# Patient Record
Sex: MALE | Race: WHITE
[De-identification: names, ages, dates, MRNs, and addresses within clinical notes are randomized per-mention and may not be internally consistent; named-entity substitution may affect disease eponyms.]

---

## 2018-08-11 ENCOUNTER — HOSPITAL ENCOUNTER (EMERGENCY)
Dept: HOSPITAL 74 - FER | Age: 83
Discharge: HOME | End: 2018-08-11
Payer: COMMERCIAL

## 2018-08-11 DIAGNOSIS — Z87.891: ICD-10-CM

## 2018-08-11 DIAGNOSIS — E03.9: ICD-10-CM

## 2018-08-11 DIAGNOSIS — D69.6: Primary | ICD-10-CM

## 2018-08-11 DIAGNOSIS — I48.91: ICD-10-CM

## 2018-08-11 DIAGNOSIS — Z95.5: ICD-10-CM

## 2018-08-11 DIAGNOSIS — E78.00: ICD-10-CM

## 2018-08-11 DIAGNOSIS — Z79.01: ICD-10-CM

## 2018-08-11 LAB
ALBUMIN SERPL-MCNC: 3.5 G/DL (ref 3.5–5)
ALP SERPL-CCNC: 77 U/L (ref 32–92)
ALT SERPL-CCNC: 36 U/L (ref 10–40)
ANION GAP SERPL CALC-SCNC: 9 MMOL/L (ref 8–16)
AST SERPL-CCNC: 33 U/L (ref 10–42)
BASOPHILS # BLD: 0.5 % (ref 0–2)
BILIRUB SERPL-MCNC: 1.1 MG/DL (ref 0.2–1)
BUN SERPL-MCNC: 35 MG/DL (ref 7–18)
CALCIUM SERPL-MCNC: 8 MG/DL (ref 8.4–10.2)
CHLORIDE SERPL-SCNC: 103 MMOL/L (ref 98–107)
CO2 SERPL-SCNC: 26 MMOL/L (ref 22–28)
CREAT SERPL-MCNC: 1.3 MG/DL (ref 0.6–1.3)
DEPRECATED RDW RBC AUTO: 13.7 % (ref 11.9–15.9)
EOSINOPHIL # BLD: 1.1 % (ref 0–4.5)
GLUCOSE SERPL-MCNC: 121 MG/DL (ref 74–106)
HCT VFR BLD CALC: 35.9 % (ref 35.4–49)
HGB BLD-MCNC: 12 GM/DL (ref 11.7–16.9)
INR BLD: 2.99 (ref 0.82–1.09)
LYMPHOCYTES # BLD: 21.4 % (ref 8–40)
MCH RBC QN AUTO: 31.5 PG (ref 25.7–33.7)
MCHC RBC AUTO-ENTMCNC: 33.5 G/DL (ref 32–35.9)
MCV RBC: 94.2 FL (ref 80–96)
MONOCYTES # BLD AUTO: 8.1 % (ref 3.8–10.2)
NEUTROPHILS # BLD: 68.9 % (ref 42.8–82.8)
PLATELET # BLD AUTO: 105 K/MM3 (ref 134–434)
PMV BLD: 9.7 FL (ref 7.5–11.1)
POTASSIUM SERPLBLD-SCNC: 3.3 MMOL/L (ref 3.5–5.1)
PROT SERPL-MCNC: 6.3 G/DL (ref 6.4–8.3)
PT PNL PPP: 32.8 SEC (ref 10.2–13)
RBC # BLD AUTO: 3.81 M/MM3 (ref 4–5.6)
SODIUM SERPL-SCNC: 138 MMOL/L (ref 136–145)
WBC # BLD AUTO: 9.4 K/MM3 (ref 4–10.8)

## 2018-08-11 NOTE — PDOC
History of Present Illness





- General


History Source: Patient, Family


Exam Limitations: No Limitations





- History of Present Illness


Initial Comments: 





08/11/18 19:55


Patient is an 85 year old male with a significant past medical history of Heart 

attack (on coumadin), Stroke, Gout, Arthritis, who presents to the ED with 

complaints of left shoulder and arm pain that began earlier this week.  As per 

patient's daughter, patient has been complaining to her about his left shoulder 

and arm pain that he states increased in intensity last night.  She reports 

coming to his home this morning when she noticed a large bruise on his left 

arm.  Patients daughter reports calling his primary care doctor who advised 

she take him into the ED for further evaluation and to have a PT and InR test 

conducted.  





Denies chest pain, Sob. Denies nausea, vomiting. Denies fevers, chills. Denies 

contact with sick individuals, out of state travelling. Denies dysuria, 

hematuria. Denies diarrhea, constipation. Denies trauma to affected area. 

Denies any other symptoms. 





Allergies: Sulfa, Sulfamethoxazole, trimethoprim


Social history: Lives with wife. No smoking. No alcohol. No illicit drugs. 


Surgical history: x3 cardiac stents. 


PMD: Dr. David Knox








<Karl Perez - Last Filed: 08/11/18 19:55>





<Kiesha Jacobo - Last Filed: 08/11/18 21:33>





- General


Chief Complaint: Injury


Stated Complaint: SHOULDER HEMATOMA


Time Seen by Provider: 08/11/18 19:37





Past History





<Karl Perez - Last Filed: 08/11/18 19:55>





- Past Medical History


Cardiac Disorders: Yes (A-FIB)


CVA: Yes


COPD: No


Hypercholesterolemia: Yes


Thyroid Disease: Yes


Other medical history: GOUT





- Surgical History


Cardiac Surgery: Yes (STENT X3)





- Suicide/Smoking/Psychosocial Hx


Smoking History: Former smoker


Have you smoked in the past 12 months: No


If you are a former smoker, when did you quit?: 1971


Information on smoking cessation initiated: No


Substance Use Type: None





<Kiesha Jacobo - Last Filed: 08/11/18 21:33>





- Past Medical History


Allergies/Adverse Reactions: 


 Allergies











Allergy/AdvReac Type Severity Reaction Status Date / Time


 


Sulfa (Sulfonamide Allergy Mild Rash Verified 08/11/18 18:28





Antibiotics)     


 


sulfamethoxazole Allergy Mild Rash Verified 08/11/18 18:28





[From Bactrim]     


 


trimethoprim [From Bactrim] Allergy Mild Rash Verified 08/11/18 18:28











Home Medications: 


Ambulatory Orders





Allopurinol [Zyloprim -] 200 mg PO DAILY 08/11/18 


Aspirin Coated [Ecotrin -] 81 mg PO DAILY 08/11/18 


Atorvastatin Calcium 40 mg PO HS 08/11/18 


Clopidogrel Bisulfate [Clopidogrel] 75 mg PO DAILY 08/11/18 


Colchicine 0.6 mg PO DAILY 08/11/18 


Furosemide [Lasix] 40 mg PO BID 08/11/18 


Levothyroxine [Synthroid -] 75 mcg PO DAILY 08/11/18 


Lisinopril 10 mg PO DAILY 08/11/18 


Metoprolol Tartrate [Lopressor] 50 mg PO BID 08/11/18 


Potassium Chloride 10 meq PO BID 08/11/18 


Warfarin Na [Coumadin] 2.5 mg PO MOTUWETHFRSA 08/11/18 


Warfarin Sodium [Coumadin] 5 mg PO HASSAN 08/11/18 











**Review of Systems





- Review of Systems


Able to Perform ROS?: Yes


Comments:: 





08/11/18 19:55


GENERAL/CONSTITUTIONAL: No fever or chills. No weakness.


HEAD, EYES, EARS, NOSE AND THROAT: No change in vision. No ear pain or 

discharge. No sore throat.


CARDIOVASCULAR: No chest pain or shortness of breath.


RESPIRATORY: No cough, wheezing, or hemoptysis.


GASTROINTESTINAL: No nausea, vomiting, diarrhea or constipation.


GENITOURINARY: No dysuria, frequency, or change in urination.


MUSCULOSKELETAL: +Left arm pain. +Left shoulder pain 


No joint or muscle swelling No neck or back pain.


SKIN: No rash


NEUROLOGIC: No headache, vertigo, loss of consciousness, or change in strength/

sensation.


ENDOCRINE: No increased thirst. No abnormal weight change.


HEMATOLOGIC/LYMPHATIC: No anemia, easy bleeding, or history of blood clots.


ALLERGIC/IMMUNOLOGIC: No hives or skin allergy.








<Karl Perez - Last Filed: 08/11/18 19:55>





*Physical Exam





- Physical Exam


Comments: 





08/11/18 19:55


GENERAL: Awake, alert, and fully oriented, in no acute distress


HEAD: No signs of trauma


EYES: PERRLA, EOMI, sclera anicteric, conjunctiva clear


ENT: Auricles normal inspection, hearing grossly normal, nares patent, 

oropharynx clear without exudates. Moist mucosa


NECK: Normal ROM, supple, no lymphadenopathy, JVD, or masses


LUNGS: Breath sounds equal, clear to auscultation bilaterally.  No wheezes, and 

no crackles


HEART: Regular rate and rhythm, normal S1 and S2, no murmurs, rubs or gallops


ABDOMEN: Soft, nontender, normoactive bowel sounds.  No guarding, no rebound.  

No masses


EXTREMITIES: +Entire Right arm bicep black and blue with large bruise 7 inches 

by 7 inches wrapping around his arm. +Tense but not painful. +Full ROM of his 

entire arm. +Good pulses. +Good color. No evidence of compartment syndrome. 


Normal range of motion, no edema.  No clubbing or cyanosis. No cords, erythema, 

or tenderness


NEUROLOGICAL: Cranial nerves II through XII grossly intact.  Normal speech, 

normal gait


SKIN: Warm, Dry, normal turgor, no rashes or lesions noted.








<Karl Perez - Last Filed: 08/11/18 19:55>





ED Treatment Course





- LABORATORY


CBC & Chemistry Diagram: 


 08/11/18 20:00





 08/11/18 20:00





<Kiesha Jacobo - Last Filed: 08/11/18 21:33>





Medical Decision Making





- Medical Decision Making





08/11/18 21:31


Pt comes with bruising all over and a large hematoma at the left bicep area.


No compartment syndrome. Pt has FROM of shoulders, elbow and wrist and fingers, 

good strength and no pain with ROM.


Pt is on asa, plavix and warfarin. INR is normal, but he has a low platelwet 

count 105K.


He will be referred to hematology.





<Kiesha Jacobo - Last Filed: 08/11/18 21:33>





*DC/Admit/Observation/Transfer





- Attestations


Scribe Attestion: 





08/11/18 19:55





Documentation prepared by Karl Perez, acting as medical scribe for Kiesha Jacobo MD. 





<Karl Perez - Last Filed: 08/11/18 19:55>





- Discharge Dispostion


Decision to Admit order: No





<Kiesha Jacobo - Last Filed: 08/11/18 21:33>


Diagnosis at time of Disposition: 


 Thrombocytopenia








- Discharge Dispostion


Disposition: HOME


Condition at time of disposition: Stable





- Referrals


Referrals: 


David Knox [Primary Care Provider] - 


Ciro Linder MD [Staff Physician] - 


Gumaro Nagy MD [Staff Physician] - 





- Patient Instructions


Printed Discharge Instructions:  Platelet Count





- Post Discharge Activity

## 2018-12-12 ENCOUNTER — RX RENEWAL (OUTPATIENT)
Age: 83
End: 2018-12-12

## 2018-12-12 PROBLEM — Z00.00 ENCOUNTER FOR PREVENTIVE HEALTH EXAMINATION: Status: ACTIVE | Noted: 2018-12-12

## 2018-12-13 ENCOUNTER — RX RENEWAL (OUTPATIENT)
Age: 83
End: 2018-12-13

## 2019-01-02 ENCOUNTER — RX RENEWAL (OUTPATIENT)
Age: 84
End: 2019-01-02

## 2019-01-08 ENCOUNTER — RECORD ABSTRACTING (OUTPATIENT)
Age: 84
End: 2019-01-08

## 2019-01-08 DIAGNOSIS — D69.3 IMMUNE THROMBOCYTOPENIC PURPURA: ICD-10-CM

## 2019-01-08 DIAGNOSIS — I25.2 OLD MYOCARDIAL INFARCTION: ICD-10-CM

## 2019-01-08 DIAGNOSIS — E03.9 HYPOTHYROIDISM, UNSPECIFIED: ICD-10-CM

## 2019-01-08 DIAGNOSIS — N40.0 BENIGN PROSTATIC HYPERPLASIA WITHOUT LOWER URINARY TRACT SYMPMS: ICD-10-CM

## 2019-01-08 DIAGNOSIS — M17.10 UNILATERAL PRIMARY OSTEOARTHRITIS, UNSPECIFIED KNEE: ICD-10-CM

## 2019-01-08 DIAGNOSIS — R91.1 SOLITARY PULMONARY NODULE: ICD-10-CM

## 2019-01-08 DIAGNOSIS — Z84.89 FAMILY HISTORY OF OTHER SPECIFIED CONDITIONS: ICD-10-CM

## 2019-01-08 DIAGNOSIS — Z87.891 PERSONAL HISTORY OF NICOTINE DEPENDENCE: ICD-10-CM

## 2019-01-08 DIAGNOSIS — Z78.9 OTHER SPECIFIED HEALTH STATUS: ICD-10-CM

## 2019-01-08 DIAGNOSIS — M48.061 SPINAL STENOSIS, LUMBAR REGION WITHOUT NEUROGENIC CLAUDICATION: ICD-10-CM

## 2019-01-08 DIAGNOSIS — Z80.9 FAMILY HISTORY OF MALIGNANT NEOPLASM, UNSPECIFIED: ICD-10-CM

## 2019-01-08 RX ORDER — LISINOPRIL 10 MG/1
10 TABLET ORAL
Refills: 0 | Status: ACTIVE | COMMUNITY

## 2019-01-08 RX ORDER — ATORVASTATIN CALCIUM 40 MG/1
40 TABLET, FILM COATED ORAL
Refills: 0 | Status: ACTIVE | COMMUNITY

## 2019-01-08 RX ORDER — LEVOTHYROXINE SODIUM 75 UG/1
75 TABLET ORAL
Refills: 0 | Status: ACTIVE | COMMUNITY

## 2019-01-08 RX ORDER — ASPIRIN 325 MG/1
TABLET, FILM COATED ORAL
Refills: 0 | Status: ACTIVE | COMMUNITY

## 2019-01-08 RX ORDER — CLOPIDOGREL 75 MG/1
75 TABLET, FILM COATED ORAL
Refills: 0 | Status: ACTIVE | COMMUNITY

## 2019-01-17 ENCOUNTER — RX RENEWAL (OUTPATIENT)
Age: 84
End: 2019-01-17

## 2019-02-19 ENCOUNTER — RX RENEWAL (OUTPATIENT)
Age: 84
End: 2019-02-19

## 2019-03-27 ENCOUNTER — APPOINTMENT (OUTPATIENT)
Dept: RHEUMATOLOGY | Facility: CLINIC | Age: 84
End: 2019-03-27
Payer: MEDICARE

## 2019-03-27 VITALS
SYSTOLIC BLOOD PRESSURE: 120 MMHG | HEIGHT: 64 IN | WEIGHT: 196 LBS | BODY MASS INDEX: 33.46 KG/M2 | DIASTOLIC BLOOD PRESSURE: 60 MMHG

## 2019-03-27 DIAGNOSIS — M19.91 PRIMARY OSTEOARTHRITIS, UNSPECIFIED SITE: ICD-10-CM

## 2019-03-27 PROCEDURE — 99214 OFFICE O/P EST MOD 30 MIN: CPT

## 2019-03-27 RX ORDER — FUROSEMIDE 40 MG/1
40 TABLET ORAL
Qty: 180 | Refills: 0 | Status: ACTIVE | COMMUNITY
Start: 2019-01-02

## 2019-03-27 RX ORDER — METOPROLOL TARTRATE 50 MG/1
50 TABLET ORAL TWICE DAILY
Refills: 0 | Status: ACTIVE | COMMUNITY

## 2019-03-27 RX ORDER — POTASSIUM CHLORIDE 1.5 G/1
POWDER, FOR SOLUTION ORAL
Refills: 0 | Status: ACTIVE | COMMUNITY

## 2019-03-27 NOTE — PHYSICAL EXAM
[General Appearance - Alert] : alert [General Appearance - In No Acute Distress] : in no acute distress [General Appearance - Well Nourished] : well nourished [General Appearance - Well Developed] : well developed [Sclera] : the sclera and conjunctiva were normal [] : no respiratory distress [Motor Exam] : the motor exam was normal [FreeTextEntry1] : No tophi

## 2019-03-27 NOTE — HISTORY OF PRESENT ILLNESS
[FreeTextEntry1] : Patient has not had any gout attacks since last visit while on prophylactic colchicine. No flare of RA. No infection since last visit. Sustained a left shoulder (humeral) fracture in January - healing well per patient.

## 2019-03-27 NOTE — REVIEW OF SYSTEMS
[Joint Pain] : joint pain [Fever] : no fever [Chills] : no chills [Eye Pain] : no eye pain [Red Eyes] : eyes not red [Shortness Of Breath] : no shortness of breath [Cough] : no cough [Abdominal Pain] : no abdominal pain [Diarrhea] : no diarrhea [Dysuria] : no dysuria [Joint Swelling] : no joint swelling [Joint Stiffness] : no joint stiffness [Skin Lesions] : no skin lesions [FreeTextEntry6] : No pleuritic C/P

## 2019-08-26 ENCOUNTER — RX CHANGE (OUTPATIENT)
Age: 84
End: 2019-08-26

## 2019-09-27 ENCOUNTER — RX RENEWAL (OUTPATIENT)
Age: 84
End: 2019-09-27

## 2019-10-01 ENCOUNTER — RX RENEWAL (OUTPATIENT)
Age: 84
End: 2019-10-01

## 2019-12-13 ENCOUNTER — APPOINTMENT (OUTPATIENT)
Dept: RHEUMATOLOGY | Facility: CLINIC | Age: 84
End: 2019-12-13
Payer: MEDICARE

## 2019-12-13 VITALS
DIASTOLIC BLOOD PRESSURE: 78 MMHG | HEIGHT: 64 IN | WEIGHT: 199 LBS | BODY MASS INDEX: 33.97 KG/M2 | SYSTOLIC BLOOD PRESSURE: 140 MMHG

## 2019-12-13 DIAGNOSIS — M1A.00X0 IDIOPATHIC CHRONIC GOUT, UNSPECIFIED SITE, W/OUT TOPHUS (TOPHI): ICD-10-CM

## 2019-12-13 PROCEDURE — 99214 OFFICE O/P EST MOD 30 MIN: CPT

## 2019-12-15 PROBLEM — M1A.00X0 CHRONIC GOUTY ARTHROPATHY: Status: ACTIVE | Noted: 2019-01-02

## 2019-12-15 RX ORDER — COLCHICINE 0.6 MG/1
0.6 TABLET ORAL
Qty: 45 | Refills: 1 | Status: ACTIVE | COMMUNITY
Start: 2019-01-02

## 2019-12-15 NOTE — HISTORY OF PRESENT ILLNESS
[FreeTextEntry1] : Patient reports no flare of RA joint symptoms. Taking Orencia weekly, and is now being treated for recurrent epididymitis.  Now gout attacks since last visit, and most recent uric acid level done at PCP office recently was <6.

## 2020-01-05 ENCOUNTER — RX RENEWAL (OUTPATIENT)
Age: 85
End: 2020-01-05

## 2020-04-20 ENCOUNTER — RX RENEWAL (OUTPATIENT)
Age: 85
End: 2020-04-20

## 2020-09-09 ENCOUNTER — APPOINTMENT (OUTPATIENT)
Dept: RHEUMATOLOGY | Facility: CLINIC | Age: 85
End: 2020-09-09

## 2020-09-16 ENCOUNTER — APPOINTMENT (OUTPATIENT)
Dept: RHEUMATOLOGY | Facility: CLINIC | Age: 85
End: 2020-09-16
Payer: MEDICARE

## 2020-09-16 VITALS
HEIGHT: 64 IN | WEIGHT: 198 LBS | DIASTOLIC BLOOD PRESSURE: 68 MMHG | BODY MASS INDEX: 33.8 KG/M2 | TEMPERATURE: 98.1 F | SYSTOLIC BLOOD PRESSURE: 128 MMHG

## 2020-09-16 PROCEDURE — 99214 OFFICE O/P EST MOD 30 MIN: CPT

## 2020-09-16 RX ORDER — METHYLPREDNISOLONE 4 MG/1
4 TABLET ORAL
Qty: 1 | Refills: 0 | Status: COMPLETED | COMMUNITY
Start: 2020-09-16 | End: 2020-09-22

## 2020-09-16 RX ORDER — ABATACEPT 125 MG/ML
125 INJECTION, SOLUTION SUBCUTANEOUS
Qty: 12 | Refills: 3 | Status: DISCONTINUED | COMMUNITY
Start: 2018-12-13 | End: 2020-09-16

## 2020-09-16 NOTE — HISTORY OF PRESENT ILLNESS
[FreeTextEntry1] : Patient comes in with one month of progressive, severe joint pain, swelling and stiffness - can not hold a newspaper.  He has pain in the elbows, wrists, hands, knees and feet. Taking Orencia as Rx'ed.

## 2020-09-23 ENCOUNTER — APPOINTMENT (OUTPATIENT)
Dept: RHEUMATOLOGY | Facility: CLINIC | Age: 85
End: 2020-09-23
Payer: MEDICARE

## 2020-09-23 VITALS
BODY MASS INDEX: 33.12 KG/M2 | DIASTOLIC BLOOD PRESSURE: 72 MMHG | HEIGHT: 64 IN | SYSTOLIC BLOOD PRESSURE: 130 MMHG | WEIGHT: 194 LBS

## 2020-09-23 DIAGNOSIS — Z87.438 PERSONAL HISTORY OF OTHER DISEASES OF MALE GENITAL ORGANS: ICD-10-CM

## 2020-09-23 DIAGNOSIS — R76.11 NONSPECIFIC REACTION TO TUBERCULIN SKIN TEST W/OUT ACTIVE TUBERCULOSIS: ICD-10-CM

## 2020-09-23 DIAGNOSIS — M06.09 RHEUMATOID ARTHRITIS W/OUT RHEUMATOID FACTOR, MULTIPLE SITES: ICD-10-CM

## 2020-09-23 PROCEDURE — 99214 OFFICE O/P EST MOD 30 MIN: CPT

## 2020-09-23 NOTE — HISTORY OF PRESENT ILLNESS
[FreeTextEntry1] : Patient reports that within one day joint symptoms completely resolved. Feels much better now while taking Medrol 8 mg daily.

## 2020-09-23 NOTE — PHYSICAL EXAM
[General Appearance - Alert] : alert [General Appearance - In No Acute Distress] : in no acute distress [General Appearance - Well Nourished] : well nourished [General Appearance - Well Developed] : well developed [Sclera] : the sclera and conjunctiva were normal [Motor Exam] : the motor exam was normal [FreeTextEntry1] : No tophi

## 2020-09-30 RX ORDER — ETANERCEPT 50 MG/ML
50 SOLUTION SUBCUTANEOUS
Qty: 1 | Refills: 5 | Status: ACTIVE | COMMUNITY
Start: 2020-09-29 | End: 1900-01-01

## 2020-11-06 ENCOUNTER — APPOINTMENT (OUTPATIENT)
Dept: RHEUMATOLOGY | Facility: CLINIC | Age: 85
End: 2020-11-06

## 2020-12-04 ENCOUNTER — RX RENEWAL (OUTPATIENT)
Age: 85
End: 2020-12-04

## 2020-12-04 RX ORDER — ALLOPURINOL 300 MG/1
300 TABLET ORAL
Qty: 90 | Refills: 3 | Status: ACTIVE | COMMUNITY
Start: 2019-09-27 | End: 1900-01-01

## 2020-12-16 ENCOUNTER — APPOINTMENT (OUTPATIENT)
Dept: RHEUMATOLOGY | Facility: CLINIC | Age: 85
End: 2020-12-16

## 2021-01-04 ENCOUNTER — RX RENEWAL (OUTPATIENT)
Age: 86
End: 2021-01-04

## 2021-02-01 ENCOUNTER — RX RENEWAL (OUTPATIENT)
Age: 86
End: 2021-02-01

## 2021-02-01 RX ORDER — METHYLPREDNISOLONE 4 MG/1
4 TABLET ORAL
Qty: 60 | Refills: 2 | Status: ACTIVE | COMMUNITY
Start: 2020-09-16 | End: 1900-01-01

## 2021-02-03 ENCOUNTER — RX RENEWAL (OUTPATIENT)
Age: 86
End: 2021-02-03

## 2021-02-17 ENCOUNTER — RX RENEWAL (OUTPATIENT)
Age: 86
End: 2021-02-17

## 2021-03-30 ENCOUNTER — APPOINTMENT (OUTPATIENT)
Dept: GASTROENTEROLOGY | Facility: HOSPITAL | Age: 86
End: 2021-03-30

## 2021-03-30 ENCOUNTER — RESULT REVIEW (OUTPATIENT)
Age: 86
End: 2021-03-30

## 2021-03-31 ENCOUNTER — APPOINTMENT (OUTPATIENT)
Dept: GASTROENTEROLOGY | Facility: HOSPITAL | Age: 86
End: 2021-03-31

## 2021-03-31 ENCOUNTER — TRANSCRIPTION ENCOUNTER (OUTPATIENT)
Age: 86
End: 2021-03-31

## 2021-04-28 ENCOUNTER — APPOINTMENT (OUTPATIENT)
Dept: GASTROENTEROLOGY | Facility: CLINIC | Age: 86
End: 2021-04-28

## 2021-04-28 NOTE — HISTORY OF PRESENT ILLNESS
[de-identified] : Present  for follow up after recent  hospitalization in late3/2021 to early 4/2021. Hospitalized for  nausea / vomiting / abdominal pain / anemia / hematemesis.  Evaluation included a MRI / CT scan revealing a cystic  process arising from stomach or  pancreas.  EGD on 3/30/21 revealed a  10 cm subserosal process with overlying ulcer. 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  HGB on discharge = 11 ( s/p transfusion)\par \par Recent labs  faxed to office from  4/10/21 were reviewed and notable for   = 4077 / normal LFTS / alk phos = 200 / HGB = 10

## 2021-05-12 ENCOUNTER — APPOINTMENT (OUTPATIENT)
Dept: RHEUMATOLOGY | Facility: CLINIC | Age: 86
End: 2021-05-12

## 2021-05-20 ENCOUNTER — RX RENEWAL (OUTPATIENT)
Age: 86
End: 2021-05-20

## 2021-06-09 ENCOUNTER — APPOINTMENT (OUTPATIENT)
Dept: GASTROENTEROLOGY | Facility: CLINIC | Age: 86
End: 2021-06-09

## 2023-02-19 NOTE — PHYSICAL EXAM
HISTORY AND PHYSICAL   [facility]        Patient Identification:  Name: Morgan Acosta  Age: 72 y.o.  Sex: male  :  1950  MRN: [id number]                     Primary Care Physician: Garcia Mir MD    Chief Complaint: Shortness of air.    History of Present Illness:   Pleasant 70-year-old gentleman with longstanding history of COPD/emphysema.  He complains of about 1 week history of increasing dyspnea on exertion.  This been associated with a dry cough.  No fever sweats or chills.  No chest pain.  He does use O2 at home but only sporadically mostly at nighttime with sleeping.      Past Medical History:  Past Medical History:   Diagnosis Date   • Anesthesia     ONE TIME HE STATED HE STOPPED BREATHING DURING COLONOSCOPY   • Arthritis    • COPD (chronic obstructive pulmonary disease) (HCC)     USES O2 AT 2L AT NIGHT OCCASIONALLY   • Diverticulitis    • Emphysema of lung (HCC)    • Gout     RT GREAT TOE   • H/O complete eye exam 2017   • High cholesterol    • History of colon polyps    • [place] (hard of hearing)    • Hypertension    • Knee pain     BILATERAL   • Malignant hyperthermia due to anesthesia     PT STATES GRANDSON HAS MALIGNANT HYPERTHERMIA   • Neuropathy     IN FEET   • Pneumonia    • Prediabetes    • Short of breath on exertion     USE O2 AT NIGHT 2LMP PER NC AT NIGHT ONLY   • Skin cancer     REMOVED FROM LT HAND   • Sleep apnea     STATES HE WAS TESTED AND WAS TOLD HE HAS SLEEP APNEA, NO CPAP, PT STATES NOT SNORING AS MUCH SINCE LOST WEIGHT   • Tinnitus     BOTH EARS     Past Surgical History:  Past Surgical History:   Procedure Laterality Date   • CATARACT EXTRACTION, BILATERAL Bilateral 10/16/2017   • COLONOSCOPY  2016   • COLONOSCOPY N/A 2018    Procedure: COLONOSCOPY TO TERMINAL ILEUM WITH POLYPECTOMY ([facility]);  Surgeon: Artem Crawford MD;  Location: [facility] ENDOSCOPY;  Service: Gastroenterology   • JOINT REPLACEMENT     • KNEE ARTHROSCOPY Right    • KNEE  ARTHROSCOPY Left    • POLYPECTOMY      STATES REMOVED FROM VOICE BOX   • NE ARTHRP KNE CONDYLE&PLATU MEDIAL&LAT COMPARTMENTS Left 11/14/2016    Procedure: TOTAL KNEE ARTHROPLASTY;  Surgeon: Niels Mendoza MD;  Location: [facility] MAIN OR;  Service: Orthopedics   • SEPTOPLASTY, TURBINECTOMY, ENDOSCOPIC MAXILLARY ANTROSTOMY N/A 4/28/2022    Procedure: SEPTOPLASTY, BILATERAL INFERIOR TURBINATE REDUCTION;  Surgeon: Augie Glasgow MD;  Location: [facility] OR [facility];  Service: ENT;  Laterality: N/A;   • TOOTH EXTRACTION  1989   • TOTAL KNEE ARTHROPLASTY Right 11/14/2018    Procedure: TOTAL KNEE ARTHROPLASTY;  Surgeon: Niels Mendoza MD;  Location: [facility] MAIN OR;  Service: Orthopedics   • US GUIDED LYMPH NODE BIOPSY  8/27/2020      Home Meds:  Medications Prior to Admission   Medication Sig Dispense Refill Last Dose   • albuterol (PROAIR RESPICLICK) 108 (90 Base) MCG/ACT inhaler Inhale Every 4 (Four) Hours As Needed for Wheezing.   2/18/2023   • amLODIPine (NORVASC) 10 MG tablet Take 1 tablet by mouth Daily. 90 tablet 1 2/19/2023   • atorvastatin (LIPITOR) 20 MG tablet Take 1 tablet by mouth Daily. 90 tablet 1 2/19/2023   • gabapentin (NEURONTIN) 600 MG tablet Take 2 tablets by mouth 3 (Three) Times a Day. 540 tablet 1 2/19/2023   • lisinopril-hydrochlorothiazide (PRINZIDE,ZESTORETIC) 20-25 MG per tablet Take 1 tablet by mouth Daily. 90 tablet 1 2/19/2023   • sildenafil (REVATIO) 20 MG tablet Take 2-3 tabs 1 hour prior to intercourse 90 tablet 3 Past Month   • tamsulosin (FLOMAX) 0.4 MG capsule 24 hr capsule Take 1 capsule by mouth Every Night. 90 capsule 1 2/18/2023   • TRELEGY ELLIPTA 100-62.5-25 MCG/INH aerosol powder  Inhale 1 each Every Morning.   2/19/2023       Allergies:  Allergies   Allergen Reactions   • Erythromycin Hives     Immunizations:  Immunization History   Administered Date(s) Administered   • COVID-19 (PFIZER) BIVALENT BOOSTER 12+YRS 12/06/2022   • COVID-19 (PFIZER) PURPLE CAP 01/30/2021, 02/20/2021,  2021   • Covid-19 (Pfizer) Gray Cap 2022   • FLUAD TRI 65YR+ 2019, 2021   • Flu Vaccine Quad PF 6-35MO 10/04/2017, 10/04/2017   • Flu Vaccine Quad PF >36MO 10/04/2017   • FluLaval/Fluzone >6mos 2018   • Fluad Quad 65+ 2020   • Fluzone High Dose =>65 Years (Vaxcare ONLY) 10/05/2016, 2019   • Fluzone High-Dose 65+yrs 2021, 2022   • Pneumococcal Conjugate 13-Valent (PCV13) 2016   • Pneumococcal Polysaccharide (PPSV23) 2016, 2016, 2019   • Td 2016     Social History:   Social History     Social History Narrative   • Not on file     Social History     Tobacco Use   • Smoking status: Former     Packs/day: 0.75     Years: 50.00     Pack years: 37.50     Types: Cigarettes     Quit date: 2021     Years since quittin.5   • Smokeless tobacco: Never   • Tobacco comments:     caffeine use 10 cups coffee daily   Substance Use Topics   • Alcohol use: Yes     Comment: OCCAS     Family History:  Family History   Problem Relation Age of Onset   • Arthritis Mother    • Hypertension Mother    • Heart disease Mother    • Hypertension Father    • Sudden death Father    • Prostate cancer Father    • Pancreatic cancer Father    • Skin cancer Sister    • No Known Problems Brother    • No Known Problems Maternal Aunt    • No Known Problems Maternal Uncle    • No Known Problems Paternal Aunt    • No Known Problems Paternal Uncle    • No Known Problems Maternal Grandmother    • No Known Problems Maternal Grandfather    • No Known Problems Paternal Grandmother    • No Known Problems Paternal Grandfather    • Malig Hyperthermia Other         Review of Systems  Review of Systems   Constitutional: Negative.    HENT: Negative.    Eyes: Negative.    Respiratory:        As per history of present illness.   Cardiovascular: Negative.    Gastrointestinal: Negative.    Endocrine: Negative.    Genitourinary: Negative.    Musculoskeletal: Negative.    Skin:  Negative.    Allergic/Immunologic: Negative.    Neurological: Negative.    Hematological: Negative.    Psychiatric/Behavioral: Negative.        Objective:  T Max 24 hrs: Temp (24hrs), Av.6 °F (36.4 °C), Min:97.5 °F (36.4 °C), Max:97.6 °F (36.4 °C)    Vitals Ranges:   Temp:  [97.5 °F (36.4 °C)-97.6 °F (36.4 °C)] 97.6 °F (36.4 °C)  Heart Rate:  [70-89] 83  Resp:  [16-18] 16  BP: (115-149)/(75-98) 142/81      Exam:  Physical Exam  Constitutional:       Appearance: Normal appearance.      Comments: Overweight.   HENT:      Head: Normocephalic and atraumatic.      Right Ear: External ear normal.      Left Ear: External ear normal.      Nose: Nose normal.      Mouth/Throat:      Mouth: Mucous membranes are moist.   Eyes:      General: No scleral icterus.        Right eye: No discharge.         Left eye: No discharge.      Extraocular Movements: Extraocular movements intact.      Conjunctiva/sclera: Conjunctivae normal.   Cardiovascular:      Rate and Rhythm: Normal rate and regular rhythm.      Pulses: Normal pulses.      Heart sounds: Normal heart sounds.   Pulmonary:      Effort: Pulmonary effort is normal. No respiratory distress.      Breath sounds: No stridor. Wheezing present. No rhonchi or rales.      Comments: Bilateral wheezes.  Most pronounced at end expiratory.  Mild to moderate increase in expiratory phase.  Chest:      Chest wall: No tenderness.   Abdominal:      General: Abdomen is flat. Bowel sounds are normal. There is no distension.      Palpations: Abdomen is soft. There is no mass.      Tenderness: There is no abdominal tenderness. There is no guarding or rebound.   Musculoskeletal:      Cervical back: Neck supple.      Right lower leg: No edema.      Left lower leg: No edema.   Skin:     General: Skin is warm and dry.   Neurological:      General: No focal deficit present.      Mental Status: He is alert and oriented to person, place, and time.   Psychiatric:         Mood and Affect: Mood normal.    [General Appearance - Alert] : alert       Thought Content: Thought content normal.         Judgment: Judgment normal.      Comments: Patient does appear anxious through most of the interview.         Data Review:  All labs and radiology reviewed.    Assessment:  RSV upper respiratory tract infection: Symptomatic treatment.  No indication for antibiotics.  COPD exacerbation: Pulmonary input appreciated.  Diabetes II: On no medications for this per Medication reconciliation list.  Sliding-scale insulin as he has been started on steroids.  Peripheral neuropathy: On chronic Neurontin.  On fairly high dose.  We will see if he can get along with a mild wean.  Hypertension: Continue home meds.  Anxiety.      Plan:  Please see above.  Discharge when okay with pulmonology.    Dwain Colbert MD  2/19/2023  17:42 EST    EMR Dragon/Transcription disclaimer:   Much of this encounter note is an electronic transcription/translation of spoken language to printed text. The electronic translation of spoken language may permit erroneous, or at times, nonsensical words or phrases to be inadvertently transcribed; Although I have reviewed the note for such errors, some may still exist.      [General Appearance - In No Acute Distress] : in no acute distress [General Appearance - Well Nourished] : well nourished [General Appearance - Well Developed] : well developed [Sclera] : the sclera and conjunctiva were normal [] : no respiratory distress [Motor Exam] : the motor exam was normal [FreeTextEntry1] : No tophi